# Patient Record
Sex: MALE | URBAN - METROPOLITAN AREA
[De-identification: names, ages, dates, MRNs, and addresses within clinical notes are randomized per-mention and may not be internally consistent; named-entity substitution may affect disease eponyms.]

---

## 2021-12-09 ENCOUNTER — APPOINTMENT (RX ONLY)
Dept: URBAN - METROPOLITAN AREA CLINIC 45 | Facility: CLINIC | Age: 46
Setting detail: DERMATOLOGY
End: 2021-12-09

## 2021-12-09 DIAGNOSIS — D22 MELANOCYTIC NEVI: ICD-10-CM

## 2021-12-09 DIAGNOSIS — L64.8 OTHER ANDROGENIC ALOPECIA: ICD-10-CM

## 2021-12-09 DIAGNOSIS — L82.0 INFLAMED SEBORRHEIC KERATOSIS: ICD-10-CM

## 2021-12-09 DIAGNOSIS — L85.3 XEROSIS CUTIS: ICD-10-CM

## 2021-12-09 DIAGNOSIS — D18.0 HEMANGIOMA: ICD-10-CM

## 2021-12-09 DIAGNOSIS — L91.8 OTHER HYPERTROPHIC DISORDERS OF THE SKIN: ICD-10-CM

## 2021-12-09 PROBLEM — D23.39 OTHER BENIGN NEOPLASM OF SKIN OF OTHER PARTS OF FACE: Status: ACTIVE | Noted: 2021-12-09

## 2021-12-09 PROBLEM — D22.5 MELANOCYTIC NEVI OF TRUNK: Status: ACTIVE | Noted: 2021-12-09

## 2021-12-09 PROBLEM — D18.01 HEMANGIOMA OF SKIN AND SUBCUTANEOUS TISSUE: Status: ACTIVE | Noted: 2021-12-09

## 2021-12-09 PROCEDURE — 99203 OFFICE O/P NEW LOW 30 MIN: CPT

## 2021-12-09 PROCEDURE — ? TREATMENT REGIMEN

## 2021-12-09 PROCEDURE — ? PRESCRIPTION

## 2021-12-09 PROCEDURE — ? BENIGN DESTRUCTION

## 2021-12-09 PROCEDURE — ? COUNSELING

## 2021-12-09 RX ORDER — PHARMACY COMPOUNDING ACCESSORY
EACH MISCELLANEOUS
Qty: 50 | Refills: 3 | Status: ERX | COMMUNITY
Start: 2021-12-09

## 2021-12-09 RX ADMIN — Medication: at 00:00

## 2021-12-09 ASSESSMENT — LOCATION ZONE DERM
LOCATION ZONE: SCALP
LOCATION ZONE: AXILLAE
LOCATION ZONE: TRUNK

## 2021-12-09 ASSESSMENT — LOCATION DETAILED DESCRIPTION DERM
LOCATION DETAILED: LEFT AXILLARY VAULT
LOCATION DETAILED: RIGHT SUPERIOR UPPER BACK
LOCATION DETAILED: MIDDLE STERNUM
LOCATION DETAILED: LEFT SUPERIOR PARIETAL SCALP

## 2021-12-09 ASSESSMENT — LOCATION SIMPLE DESCRIPTION DERM
LOCATION SIMPLE: RIGHT UPPER BACK
LOCATION SIMPLE: LEFT AXILLARY VAULT
LOCATION SIMPLE: CHEST
LOCATION SIMPLE: SCALP

## 2021-12-09 NOTE — PROCEDURE: TREATMENT REGIMEN
Plan: Location: Scalp\\nPrescribe: Minoxidil 7%, finasteride 0.1%, pyridoxine 1%, hydrocortisone 1% solution, Finasteride 5mg\\n\\n\\n\\n\\nPt is here for hair loss.\\nPt discussed that it has been gradually falling out.\\nHad a long discussion with patient about male/female pattern baldness and the best treatment options to prevent further hair loss. \\n\\nWill prescribe Finasteride 5mg po QD (take 1/4 a day) to help regrow hair.\\nDiscussed with patient that Finasteride blocks 5 alpha reductase and decreases levels of Dihydrotestosterone levels to prevent miniaturization of the hair follicle.\\nDiscussed that all the side effects are temporary and if they do occur we can reduce the dose of the medication or stop it----no evidence of increased incidence of permanent impotence.\\n\\nEducated patient on side effects/risks from medication and what to expect from taking the oral medication.\\nRecommend using Rogaine to help produce hair growth from the external as well---discussed hair may fall out for the first few weeks. \\n\\n\\nF/u as needed
Detail Level: Zone

## 2021-12-10 RX ORDER — FINASTERIDE 5 MG/1
TABLET, FILM COATED ORAL
Qty: 30 | Refills: 6 | Status: ERX | COMMUNITY
Start: 2021-12-10

## 2021-12-10 RX ADMIN — FINASTERIDE: 5 TABLET, FILM COATED ORAL at 00:00

## 2022-04-14 ENCOUNTER — APPOINTMENT (RX ONLY)
Dept: URBAN - METROPOLITAN AREA CLINIC 45 | Facility: CLINIC | Age: 47
Setting detail: DERMATOLOGY
End: 2022-04-14

## 2022-04-14 DIAGNOSIS — L85.3 XEROSIS CUTIS: ICD-10-CM | Status: INADEQUATELY CONTROLLED

## 2022-04-14 DIAGNOSIS — L64.8 OTHER ANDROGENIC ALOPECIA: ICD-10-CM

## 2022-04-14 PROCEDURE — ? TREATMENT REGIMEN

## 2022-04-14 PROCEDURE — 99213 OFFICE O/P EST LOW 20 MIN: CPT

## 2022-04-14 PROCEDURE — ? PRESCRIPTION

## 2022-04-14 PROCEDURE — ? COUNSELING

## 2022-04-14 RX ORDER — PHARMACY COMPOUNDING ACCESSORY
EACH MISCELLANEOUS
Qty: 50 | Refills: 3 | Status: ERX

## 2022-04-14 RX ORDER — FINASTERIDE 5 MG/1
TABLET, FILM COATED ORAL
Qty: 30 | Refills: 6 | Status: ERX

## 2022-04-14 ASSESSMENT — LOCATION ZONE DERM
LOCATION ZONE: SCALP
LOCATION ZONE: HAND
LOCATION ZONE: HAND

## 2022-04-14 ASSESSMENT — LOCATION DETAILED DESCRIPTION DERM
LOCATION DETAILED: LEFT THENAR EMINENCE
LOCATION DETAILED: LEFT SUPERIOR PARIETAL SCALP
LOCATION DETAILED: LEFT RADIAL PALM

## 2022-04-14 ASSESSMENT — LOCATION SIMPLE DESCRIPTION DERM
LOCATION SIMPLE: LEFT HAND
LOCATION SIMPLE: LEFT HAND
LOCATION SIMPLE: SCALP

## 2022-04-14 NOTE — PROCEDURE: TREATMENT REGIMEN
Plan: Location: Scalp\\nPrescribe: Minoxidil 10%, finasteride 0.1%, Latanoprost 0.01%, biotin 0.2%*** new rx 4/14/22\\n                   Finasteride 5mg\\nPharmacy: [facility] \\n\\nFollow up \\nLOV 12/9/2021\\nPhotos taken \\n\\nPt is here for hair loss follow up\\nPt states he has been taking 1/4 of the Finasteride 5mg tablet daily with no side effects\\nHe also has been using at night the compound drops that has  Minoxidil 7%, finasteride 0.1%, pyridoxine 1%, hydrocortisone 1% solution\\nHe does notices small hair growth, but comes today for follow up and further evaluation and continuation of treatment.\\n\\nDiscussed with pt:\\nEvaluating pt today and comparing photos and evaluating with my dermatoscope there is hair growth.\\nReiterated that Finasteride blocks 5 alpha reductase and decreases levels of Dihydrotestosterone levels to prevent miniaturization of the hair follicle.\\nI will  refill his Finasteride 5mg po QD (take 1/4 a day) to help regrow hair.\\n\\nAlso reiterated about side effects are temporary and if they do occur we can reduce the dose of the medication or stop it----no evidence of increased incidence of permanent impotence.\\n\\nEducated patient on side effects/risks from medication and what to expect from taking the oral medication.\\nI will send out a new prescription that has  Minoxidil 10%, finasteride 0.1%, Latanoprost 0.01%, biotin 0.2%.\\nHe may finish off the previous drops, but is to fill this new version\\nRecommend using Rogaine to help produce hair growth from the external as well---discussed hair may fall out for the first few weeks. \\nPT VOICED UNDERSTANDING.\\n\\n\\nFollow up 1 year
Detail Level: Zone

## 2022-12-15 ENCOUNTER — APPOINTMENT (RX ONLY)
Dept: URBAN - METROPOLITAN AREA CLINIC 45 | Facility: CLINIC | Age: 47
Setting detail: DERMATOLOGY
End: 2022-12-15

## 2022-12-15 DIAGNOSIS — L82.0 INFLAMED SEBORRHEIC KERATOSIS: ICD-10-CM

## 2022-12-15 DIAGNOSIS — L91.8 OTHER HYPERTROPHIC DISORDERS OF THE SKIN: ICD-10-CM

## 2022-12-15 DIAGNOSIS — D22 MELANOCYTIC NEVI: ICD-10-CM

## 2022-12-15 DIAGNOSIS — L82.1 OTHER SEBORRHEIC KERATOSIS: ICD-10-CM

## 2022-12-15 DIAGNOSIS — L64.8 OTHER ANDROGENIC ALOPECIA: ICD-10-CM

## 2022-12-15 DIAGNOSIS — L57.0 ACTINIC KERATOSIS: ICD-10-CM

## 2022-12-15 PROBLEM — D22.4 MELANOCYTIC NEVI OF SCALP AND NECK: Status: ACTIVE | Noted: 2022-12-15

## 2022-12-15 PROCEDURE — ? COUNSELING

## 2022-12-15 PROCEDURE — ? TREATMENT REGIMEN

## 2022-12-15 PROCEDURE — 17110 DESTRUCTION B9 LES UP TO 14: CPT

## 2022-12-15 PROCEDURE — ? PRESCRIPTION

## 2022-12-15 PROCEDURE — ? LIQUID NITROGEN

## 2022-12-15 PROCEDURE — ? SKIN TAG REMOVAL

## 2022-12-15 PROCEDURE — 99213 OFFICE O/P EST LOW 20 MIN: CPT | Mod: 25

## 2022-12-15 PROCEDURE — 17000 DESTRUCT PREMALG LESION: CPT | Mod: 59

## 2022-12-15 RX ORDER — FINASTERIDE 5 MG/1
TABLET, FILM COATED ORAL
Qty: 30 | Refills: 12 | Status: ERX

## 2022-12-15 RX ORDER — PHARMACY COMPOUNDING ACCESSORY
EACH MISCELLANEOUS
Qty: 50 | Refills: 12 | Status: ERX

## 2022-12-15 ASSESSMENT — LOCATION SIMPLE DESCRIPTION DERM
LOCATION SIMPLE: POSTERIOR NECK
LOCATION SIMPLE: SCALP
LOCATION SIMPLE: RIGHT UPPER BACK
LOCATION SIMPLE: RIGHT NOSE
LOCATION SIMPLE: LEFT ANTERIOR NECK
LOCATION SIMPLE: LEFT TEMPLE

## 2022-12-15 ASSESSMENT — LOCATION ZONE DERM
LOCATION ZONE: TRUNK
LOCATION ZONE: NECK
LOCATION ZONE: SCALP
LOCATION ZONE: FACE
LOCATION ZONE: NOSE

## 2022-12-15 ASSESSMENT — LOCATION DETAILED DESCRIPTION DERM
LOCATION DETAILED: LEFT CLAVICULAR NECK
LOCATION DETAILED: LEFT SUPERIOR PARIETAL SCALP
LOCATION DETAILED: RIGHT SUPERIOR MEDIAL UPPER BACK
LOCATION DETAILED: LEFT CENTRAL TEMPLE
LOCATION DETAILED: RIGHT NASAL ALA
LOCATION DETAILED: MID POSTERIOR NECK

## 2022-12-15 NOTE — PROCEDURE: LIQUID NITROGEN
Show Aperture Variable?: Yes
Medical Necessity Clause: This procedure was medically necessary because the lesions that were treated were:
Include Z78.9 (Other Specified Conditions Influencing Health Status) As An Associated Diagnosis?: No
Detail Level: Detailed
Spray Paint Text: The liquid nitrogen was applied to the skin utilizing a spray paint frosting technique.
Post-Care Instructions: I reviewed with the patient in detail post-care instructions. Patient is to wear sunprotection, and avoid picking at any of the treated lesions. Pt may apply Vaseline to crusted or scabbing areas.
Consent: The patient's consent was obtained including but not limited to risks of crusting, scabbing, blistering, scarring, darker or lighter pigmentary change, recurrence, incomplete removal and infection.
Medical Necessity Information: It is in your best interest to select a reason for this procedure from the list below. All of these items fulfill various CMS LCD requirements except the new and changing color options.
Number Of Freeze-Thaw Cycles: 3 freeze-thaw cycles
Duration Of Freeze Thaw-Cycle (Seconds): 5
Detail Level: Generalized
Number Of Freeze-Thaw Cycles: 2 freeze-thaw cycles

## 2022-12-15 NOTE — PROCEDURE: TREATMENT REGIMEN
Plan: Location: Scalp\\nPrescribe: Minoxidil 10%, finasteride 0.1%, Latanoprost 0.01%, biotin 0.2%*** new rx 4/14/22\\n                   Finasteride 5mg\\nPharmacy: [facility] \\n\\nFollow up \\nPhotos taken \\n\\nPt is here for hair loss follow up\\nPt states he has been taking 1/4 of the Finasteride 5mg tablet daily with no side effects\\nHe ran out of the Minoxidil 10%, finasteride 0.1%, Latanoprost 0.01%, biotin 0.2% solution but he would use it nightly \\nHe has notice significant improvement and would like a refill \\nHe comes in today for follow up and further evaluation and continuation of treatment.\\n\\nDiscussed with pt:\\nEvaluating pt today and comparing photos and evaluating with my dermatoscope there is hair growth.\\nReiterated that Finasteride blocks 5 alpha reductase and decreases levels of Dihydrotestosterone levels to prevent miniaturization of the hair follicle.\\nI will refill his Finasteride 5mg po QD (take 1/4 a day) to help regrow hair.\\n\\nAlso reiterated about side effects are temporary and if they do occur we can reduce the dose of the medication or stop it----no evidence of increased incidence of permanent impotence.\\n\\nEducated patient on side effects/risks from medication and what to expect from taking the oral medication.\\nI will refill Minoxidil 10%, finasteride 0.1%, Latanoprost 0.01%, biotin 0.2%.\\nRecommend using Rogaine to help produce hair growth from the external as well---discussed hair may fall out for the first few weeks. \\nPT VOICED UNDERSTANDING.\\n\\n\\nFollow up 1 year
Detail Level: Zone

## 2022-12-15 NOTE — PROCEDURE: SKIN TAG REMOVAL
Detail Level: Detailed
Add Associated Diagnoses If Applicable When Selecting Medical Necessity: Yes
Consent: Written consent obtained and the risks of skin tag removal was reviewed with the patient including but not limited to bleeding, pigmentary change, infection, pain, and remote possibility of scarring.
Include Z78.9 (Other Specified Conditions Influencing Health Status) As An Associated Diagnosis?: No
Anesthesia Volume In Cc: 3
Medical Necessity Clause: This procedure was medically necessary because the lesions that were treated were:
Medical Necessity Information: It is in your best interest to select a reason for this procedure from the list below. All of these items fulfill various CMS LCD requirements except the new and changing color options.

## 2023-12-14 ENCOUNTER — RX ONLY (OUTPATIENT)
Age: 48
Setting detail: RX ONLY
End: 2023-12-14

## 2023-12-14 ENCOUNTER — APPOINTMENT (RX ONLY)
Dept: URBAN - METROPOLITAN AREA CLINIC 45 | Facility: CLINIC | Age: 48
Setting detail: DERMATOLOGY
End: 2023-12-14

## 2023-12-14 DIAGNOSIS — L82.0 INFLAMED SEBORRHEIC KERATOSIS: ICD-10-CM

## 2023-12-14 DIAGNOSIS — L81.4 OTHER MELANIN HYPERPIGMENTATION: ICD-10-CM

## 2023-12-14 PROBLEM — D23.5 OTHER BENIGN NEOPLASM OF SKIN OF TRUNK: Status: ACTIVE | Noted: 2023-12-14

## 2023-12-14 PROCEDURE — 99213 OFFICE O/P EST LOW 20 MIN: CPT

## 2023-12-14 PROCEDURE — ? COUNSELING

## 2023-12-14 RX ORDER — PHARMACY COMPOUNDING ACCESSORY
EACH MISCELLANEOUS
Qty: 50 | Refills: 12 | Status: ERX

## 2023-12-14 RX ORDER — FINASTERIDE 5 MG/1
TABLET, FILM COATED ORAL
Qty: 30 | Refills: 12 | Status: ERX

## 2023-12-14 ASSESSMENT — LOCATION SIMPLE DESCRIPTION DERM
LOCATION SIMPLE: UPPER BACK
LOCATION SIMPLE: LEFT UPPER BACK

## 2023-12-14 ASSESSMENT — LOCATION ZONE DERM: LOCATION ZONE: TRUNK

## 2023-12-14 ASSESSMENT — LOCATION DETAILED DESCRIPTION DERM
LOCATION DETAILED: SUPERIOR THORACIC SPINE
LOCATION DETAILED: LEFT SUPERIOR LATERAL UPPER BACK

## 2025-01-29 ENCOUNTER — RX ONLY (RX ONLY)
Age: 50
End: 2025-01-29

## 2025-01-29 RX ORDER — PHARMACY COMPOUNDING ACCESSORY
EACH MISCELLANEOUS
Qty: 50 | Refills: 0 | Status: ERX

## 2025-01-29 RX ORDER — FINASTERIDE 5 MG/1
TABLET, FILM COATED ORAL
Qty: 30 | Refills: 0 | Status: ERX

## 2025-02-20 ENCOUNTER — APPOINTMENT (OUTPATIENT)
Dept: URBAN - METROPOLITAN AREA CLINIC 45 | Facility: CLINIC | Age: 50
Setting detail: DERMATOLOGY
End: 2025-02-20

## 2025-02-20 DIAGNOSIS — L64.8 OTHER ANDROGENIC ALOPECIA: ICD-10-CM

## 2025-02-20 DIAGNOSIS — L85.3 XEROSIS CUTIS: ICD-10-CM

## 2025-02-20 PROCEDURE — 99213 OFFICE O/P EST LOW 20 MIN: CPT

## 2025-02-20 PROCEDURE — ? COUNSELING

## 2025-02-20 PROCEDURE — ? TREATMENT REGIMEN

## 2025-02-20 PROCEDURE — ? PRESCRIPTION

## 2025-02-20 RX ORDER — PHARMACY COMPOUNDING ACCESSORY
EACH MISCELLANEOUS
Qty: 50 | Refills: 12 | Status: ERX

## 2025-02-20 RX ORDER — FINASTERIDE 5 MG/1
TABLET, FILM COATED ORAL
Qty: 30 | Refills: 12 | Status: ERX

## 2025-02-20 ASSESSMENT — LOCATION ZONE DERM
LOCATION ZONE: SCALP
LOCATION ZONE: ARM

## 2025-02-20 ASSESSMENT — LOCATION SIMPLE DESCRIPTION DERM
LOCATION SIMPLE: SCALP
LOCATION SIMPLE: RIGHT ELBOW

## 2025-02-20 ASSESSMENT — LOCATION DETAILED DESCRIPTION DERM
LOCATION DETAILED: RIGHT ELBOW
LOCATION DETAILED: LEFT SUPERIOR PARIETAL SCALP

## 2025-02-20 NOTE — PROCEDURE: TREATMENT REGIMEN
Plan: Location: Scalp\\nPrescribe: Minoxidil 10%, finasteride 0.1%, Latanoprost 0.01%, biotin 0.2%*** new rx 4/14/22\\n                   Finasteride 5mg\\nPharmacy: [facility] \\n\\nFollow up \\nPhotos taken \\n2/20/25\\nPt is here for hair loss follow up\\nPt states he continues taking 1/4 of the Finasteride 5mg tablet daily and Minoxidil 10%, finasteride 0.1%, Latanoprost 0.01%, biotin 0.2% solution\\nHe has notice significant improvement and would like a refill \\nHe comes in today for follow up and further evaluation and continuation of treatment.\\n\\nDiscussed with pt:\\nEvaluating pt today and comparing photos there is hair growth.\\nReiterated that Finasteride blocks 5 alpha reductase and decreases levels of Dihydrotestosterone levels to prevent miniaturization of the hair follicle.\\nI will refill his Finasteride 5mg po QD (take 1/4 a day) to help regrow hair.\\n\\n\\nAlso reiterated about side effects are temporary and if they do occur we can reduce the dose of the medication or stop it----no evidence of increased incidence of permanent impotence.\\n\\nEducated patient on side effects/risks from medication and what to expect from taking the oral medication.\\nI will refill Minoxidil 10%, finasteride 0.1%, Latanoprost 0.01%, biotin 0.2%.\\nRecommend using Rogaine to help produce hair growth from the external as well---discussed hair may fall out for the first few weeks. \\nPT VOICED UNDERSTANDING.\\n\\n\\nFollow up 1 year
Detail Level: Zone